# Patient Record
Sex: FEMALE
[De-identification: names, ages, dates, MRNs, and addresses within clinical notes are randomized per-mention and may not be internally consistent; named-entity substitution may affect disease eponyms.]

---

## 2019-04-20 PROBLEM — Z00.00 ENCOUNTER FOR PREVENTIVE HEALTH EXAMINATION: Status: ACTIVE | Noted: 2019-04-20

## 2019-05-20 ENCOUNTER — LABORATORY RESULT (OUTPATIENT)
Age: 29
End: 2019-05-20

## 2019-05-20 ENCOUNTER — APPOINTMENT (OUTPATIENT)
Dept: OBGYN | Facility: CLINIC | Age: 29
End: 2019-05-20
Payer: COMMERCIAL

## 2019-05-20 VITALS
SYSTOLIC BLOOD PRESSURE: 118 MMHG | BODY MASS INDEX: 33.66 KG/M2 | HEIGHT: 63 IN | DIASTOLIC BLOOD PRESSURE: 70 MMHG | WEIGHT: 190 LBS

## 2019-05-20 DIAGNOSIS — Z78.9 OTHER SPECIFIED HEALTH STATUS: ICD-10-CM

## 2019-05-20 DIAGNOSIS — Z81.8 FAMILY HISTORY OF OTHER MENTAL AND BEHAVIORAL DISORDERS: ICD-10-CM

## 2019-05-20 DIAGNOSIS — Z30.41 ENCOUNTER FOR SURVEILLANCE OF CONTRACEPTIVE PILLS: ICD-10-CM

## 2019-05-20 DIAGNOSIS — Z91.89 OTHER SPECIFIED PERSONAL RISK FACTORS, NOT ELSEWHERE CLASSIFIED: ICD-10-CM

## 2019-05-20 DIAGNOSIS — N92.6 IRREGULAR MENSTRUATION, UNSPECIFIED: ICD-10-CM

## 2019-05-20 LAB
HCG UR QL: NEGATIVE
QUALITY CONTROL: YES

## 2019-05-20 PROCEDURE — 81025 URINE PREGNANCY TEST: CPT

## 2019-05-20 PROCEDURE — 99385 PREV VISIT NEW AGE 18-39: CPT

## 2019-05-20 NOTE — PHYSICAL EXAM
[Awake] : awake [Alert] : alert [Soft] : soft [Oriented x3] : oriented to person, place, and time [Normal] : uterus [No Bleeding] : there was no active vaginal bleeding [Uterine Adnexae] : were not tender and not enlarged [RRR, No Murmurs] : RRR, no murmurs [CTAB] : CTAB [Acute Distress] : no acute distress [LAD] : no lymphadenopathy [Thyroid Nodule] : no thyroid nodule [Goiter] : no goiter [Mass] : no breast mass [Nipple Discharge] : no nipple discharge [Axillary LAD] : no axillary lymphadenopathy [Tender] : non tender [Discharge] : a  ~M vaginal discharge was present [Heavy] : heavy [White] : white [Thick] : thick [FreeTextEntry5] : irregular endo cervix ( surgery sequelae)

## 2019-05-20 NOTE — HISTORY OF PRESENT ILLNESS
[___ Month(s) Ago] : [unfilled] month(s) ago [Good] : being in good health [Regular Exercise] : regular exercise [Weight Concerns] : weight concens [___ Servings of Fruit/Day] : [unfilled] servings of fruit per day [___ Servings of Vegetables/Day] : [unfilled] servings of vegetables per day [___ Servings of Meat/Day] : [unfilled] servings of meat per day [___ Servings of Whole Grains/Day] : [unfilled] servings of whole grains per day [___ Servings of Simple Carbs/Day] : [unfilled] servings of simple carbohydrates per day [___ Servings of Dairy/Day] : [unfilled] servings of dairy foods per day [___ Cups of Coffee/Day] : [unfilled] cups of coffee per day [___ Cups of Tea/Day] : [unfilled] cups of tea per day [___ Cans of Regular Soda/Day] : [unfilled] cans of regular soda per day [3 or more times/wk] :  3 or more times per week [Serious Attempts To Lose] : serious weight loss attempts [Current] : risk screening reviewed and current [Performed Within 5 Years] : lipid profile performed within the past five years [Performed Last Year] : thyroid function test performed last year [Abnormal Cervical Cytology] : abnormal cervical cytology [HPV Positive] : positive screening for human papilloma virus [Reproductive Age] : is of reproductive age [Menarche Age ____] : age at menarche was [unfilled] [Definite ___ (Date)] : the last menstrual period was [unfilled] [Lighter Bleeding] : bleeding has been lighter than normal [Regular Cycle Intervals] : have been regular [Contraception] : uses contraception [Oral Contraceptive] : uses oral contraception pills [Monogamous (Male Partner)] : is monogamous with a male partner [HPV Vaccine ___] : HPV vaccine [unfilled] [Sexually Active] : is sexually active [Monogamous] : is monogamous [Age of First Sexual [place] ___] : became sexually active at the age of [unfilled] [Male ___] : [unfilled] male [Yes] : yes [Hypertension] : no hypertension [Diabetes] : no diabetes [High LDL] : no high LDL cholesterol [Low HDL] : no low HDL cholesterol [Stress] : no stress [Obesity] : no obesity [Tobacco Use] : no tobacco use [Illicit Drug Use] : no illicit drug use [Sedentary Lifestyle] : no sedentary lifestyle [FH Cardiovascular Disease] : no family history of cardiovascular disease [Previous Breast Cancer] : no previous breast cancer [Inflammatory Bowel Disease] : no inflammatory bowel disease [Menstrual Problems] : reports normal menses [Pregnancy History] : denies prior pregnancies

## 2019-05-20 NOTE — COUNSELING
[Breast Self Exam] : breast self exam [Nutrition] : nutrition [Exercise] : exercise [Vitamins/Supplements] : vitamins/supplements [STD (testing, results, tx)] : STD (testing, results, tx) [Contraception] : contraception [Emergency Contraception] : emergency contraception [Vaccines] : vaccines [Safe Sexual Practices] : safe sexual practices

## 2019-05-23 LAB
C TRACH RRNA SPEC QL NAA+PROBE: NOT DETECTED
N GONORRHOEA RRNA SPEC QL NAA+PROBE: NOT DETECTED
SOURCE TP AMPLIFICATION: NORMAL

## 2019-05-30 LAB — CYTOLOGY CVX/VAG DOC THIN PREP: NORMAL

## 2019-06-06 ENCOUNTER — RESULT REVIEW (OUTPATIENT)
Age: 29
End: 2019-06-06

## 2019-07-17 ENCOUNTER — APPOINTMENT (OUTPATIENT)
Dept: OBGYN | Facility: CLINIC | Age: 29
End: 2019-07-17
Payer: COMMERCIAL

## 2019-07-17 VITALS
SYSTOLIC BLOOD PRESSURE: 120 MMHG | HEIGHT: 63 IN | DIASTOLIC BLOOD PRESSURE: 72 MMHG | BODY MASS INDEX: 32.6 KG/M2 | WEIGHT: 184 LBS

## 2019-07-17 LAB
BILIRUB UR QL STRIP: NORMAL
CLARITY UR: NORMAL
COLLECTION METHOD: NORMAL
GLUCOSE UR-MCNC: NORMAL
HCG UR QL: 1 EU/DL
HCG UR QL: NEGATIVE
HGB UR QL STRIP.AUTO: NORMAL
KETONES UR-MCNC: NORMAL
LEUKOCYTE ESTERASE UR QL STRIP: NORMAL
NITRITE UR QL STRIP: NORMAL
PH UR STRIP: 6.5
PROT UR STRIP-MCNC: NORMAL
QUALITY CONTROL: YES
SP GR UR STRIP: 1.03

## 2019-07-17 PROCEDURE — 57456 ENDOCERV CURETTAGE W/SCOPE: CPT

## 2019-07-17 PROCEDURE — 81025 URINE PREGNANCY TEST: CPT

## 2019-07-17 PROCEDURE — 81003 URINALYSIS AUTO W/O SCOPE: CPT | Mod: QW

## 2019-07-17 NOTE — PROCEDURE
[Colposcopy] : colposcopy [HPV high risk] : PCR positive for high risk HPV [LGSIL] : low grade squamous intraepithelial lesion [Patient] : patient [Risks] : risks [Benefits] : benefits [Alternatives] : alternatives [Infection] : infection [Bleeding] : bleeding [Allergic Reaction] : allergic reaction [Consent Obtained] : written consent was obtained prior to the procedure [Ibuprofen ___ mg] : ibuprofen [unfilled] ~Umg [Pap Performed] : a cervical Pap smear was not performed [SCJ Fully Visulized] : the squamocolumnar junction was not fully visualized [Punctation ___ o'clock] : punctation at [unfilled] ~Uo'clock [Acetowhite ___ o'clock] : ascetowhite changes at [unfilled] ~Uo'clock [No Abnormalities] : no abnormalities seen [Biopsies Taken: # ___] : [unfilled] biopsies taken of the cervix [Biopsy Locations ___ o'clock] : the biopsies were taken at [unfilled] o'clock [ECC Done] : Endocervical curettage was performed.  [Amirah's] : Amirah's solution [Direct Pressure] : direct pressure [Tolerated Well] : the patient tolerated the procedure well [No Complications] : there were no complications [FreeTextEntry2] : ECTROPION AT ANTERIOR CX LIP

## 2019-07-25 LAB — CORE LAB BIOPSY: NORMAL

## 2019-11-21 ENCOUNTER — RX RENEWAL (OUTPATIENT)
Age: 29
End: 2019-11-21

## 2020-05-21 ENCOUNTER — APPOINTMENT (OUTPATIENT)
Dept: OBGYN | Facility: CLINIC | Age: 30
End: 2020-05-21
Payer: COMMERCIAL

## 2020-05-21 ENCOUNTER — LABORATORY RESULT (OUTPATIENT)
Age: 30
End: 2020-05-21

## 2020-05-21 VITALS
HEIGHT: 63 IN | SYSTOLIC BLOOD PRESSURE: 124 MMHG | WEIGHT: 187 LBS | DIASTOLIC BLOOD PRESSURE: 68 MMHG | BODY MASS INDEX: 33.13 KG/M2

## 2020-05-21 DIAGNOSIS — R87.612 LOW GRADE SQUAMOUS INTRAEPITHELIAL LESION ON CYTOLOGIC SMEAR OF CERVIX (LGSIL): ICD-10-CM

## 2020-05-21 DIAGNOSIS — Z01.419 ENCOUNTER FOR GYNECOLOGICAL EXAMINATION (GENERAL) (ROUTINE) W/OUT ABNORMAL FINDINGS: ICD-10-CM

## 2020-05-21 DIAGNOSIS — Z87.42 PERSONAL HISTORY OF OTHER DISEASES OF THE FEMALE GENITAL TRACT: ICD-10-CM

## 2020-05-21 LAB
HCG UR QL: NEGATIVE
QUALITY CONTROL: YES

## 2020-05-21 PROCEDURE — 81025 URINE PREGNANCY TEST: CPT

## 2020-05-21 PROCEDURE — 99395 PREV VISIT EST AGE 18-39: CPT

## 2020-05-21 RX ORDER — LEVONORGESTREL AND ETHINYL ESTRADIOL 0.1-0.02MG
0.1-2 KIT ORAL
Qty: 84 | Refills: 3 | Status: ACTIVE | COMMUNITY
Start: 2019-05-20 | End: 1900-01-01

## 2020-05-21 NOTE — HISTORY OF PRESENT ILLNESS
[1 Year Ago] : 1 year ago [Good] : being in good health [Regular Exercise] : regular exercise [Weight Concerns] : weight concens [___ Servings of Fruit/Day] : [unfilled] servings of fruit per day [___ Servings of Vegetables/Day] : [unfilled] servings of vegetables per day [___ Servings of Meat/Day] : [unfilled] servings of meat per day [___ Servings of Whole Grains/Day] : [unfilled] servings of whole grains per day [___ Servings of Simple Carbs/Day] : [unfilled] servings of simple carbohydrates per day [___ Servings of Dairy/Day] : [unfilled] servings of dairy foods per day [___ Cups of Coffee/Day] : [unfilled] cups of coffee per day [___ Cups of Tea/Day] : [unfilled] cups of tea per day [___ Cans of Regular Soda/Day] : [unfilled] cans of regular soda per day [Serious Attempts To Lose] : serious weight loss attempts [3 or more times/wk] :  3 or more times per week [Current] : risk screening reviewed and current [Last Pap ___] : Last cervical pap smear was [unfilled] [Performed Within 5 Years] : lipid profile performed within the past five years [Performed Last Year] : thyroid function test performed last year [Abnormal Cervical Cytology] : abnormal cervical cytology [HPV Positive] : positive screening for human papilloma virus [Reproductive Age] : is of reproductive age [Menarche Age ____] : age at menarche was [unfilled] [Definite ___ (Date)] : the last menstrual period was [unfilled] [Lighter Bleeding] : bleeding has been lighter than normal [Regular Cycle Intervals] : have been regular [Contraception] : uses contraception [Oral Contraceptive] : uses oral contraception pills [Monogamous (Male Partner)] : is monogamous with a male partner [HPV Vaccine ___] : HPV vaccine [unfilled] [Last Screen ___] : last STD screen was [unfilled] [Sexually Active] : is sexually active [Monogamous] : is monogamous [Age of First Sexual [place] ___] : became sexually active at the age of [unfilled] [Male ___] : [unfilled] male [Yes] : yes [Healthy Diet] : a healthy diet [Hypertension] : no hypertension [Diabetes] : no diabetes [High LDL] : no high LDL cholesterol [Low HDL] : no low HDL cholesterol [Stress] : no stress [Tobacco Use] : no tobacco use [Obesity] : no obesity [Illicit Drug Use] : no illicit drug use [Sedentary Lifestyle] : no sedentary lifestyle [FH Cardiovascular Disease] : no family history of cardiovascular disease [Previous Breast Cancer] : no previous breast cancer [Inflammatory Bowel Disease] : no inflammatory bowel disease [Menstrual Problems] : reports normal menses [Pregnancy History] : denies prior pregnancies [Chlamydia] : Chlamydia - [Gonorrhea] : Gonorrhea -

## 2020-05-21 NOTE — PHYSICAL EXAM
[Awake] : awake [Alert] : alert [Soft] : soft [Oriented x3] : oriented to person, place, and time [Normal] : uterus [Discharge] : a  ~M vaginal discharge was present [Heavy] : heavy [White] : white [Thick] : thick [No Bleeding] : there was no active vaginal bleeding [Uterine Adnexae] : were not tender and not enlarged [RRR, No Murmurs] : RRR, no murmurs [CTAB] : CTAB [Acute Distress] : no acute distress [LAD] : no lymphadenopathy [Thyroid Nodule] : no thyroid nodule [Goiter] : no goiter [Mass] : no breast mass [Nipple Discharge] : no nipple discharge [Tender] : non tender [Axillary LAD] : no axillary lymphadenopathy [FreeTextEntry4] : continued cervical leukorrhea [FreeTextEntry5] : irregular endo cervix ( surgery sequelae)

## 2020-05-21 NOTE — CHIEF COMPLAINT
[Annual Visit] : annual visit [FreeTextEntry1] : Pt here for annual, repap and to refill ocp\par Pt complains of some pms problems for about last few months

## 2020-06-01 LAB — CYTOLOGY CVX/VAG DOC THIN PREP: ABNORMAL

## 2020-09-17 ENCOUNTER — APPOINTMENT (OUTPATIENT)
Dept: OBGYN | Facility: CLINIC | Age: 30
End: 2020-09-17
Payer: COMMERCIAL

## 2020-09-17 VITALS — DIASTOLIC BLOOD PRESSURE: 72 MMHG | SYSTOLIC BLOOD PRESSURE: 130 MMHG | HEIGHT: 63 IN

## 2020-09-17 DIAGNOSIS — N89.8 OTHER SPECIFIED NONINFLAMMATORY DISORDERS OF VAGINA: ICD-10-CM

## 2020-09-17 DIAGNOSIS — N94.10 UNSPECIFIED DYSPAREUNIA: ICD-10-CM

## 2020-09-17 PROCEDURE — 99213 OFFICE O/P EST LOW 20 MIN: CPT

## 2020-09-17 NOTE — CHIEF COMPLAINT
[Urgent Visit] : Urgent Visit [FreeTextEntry1] : Pt states that she is having some discomfort at beginning of intercourse and afterward has pain urinating.   Cannot tolerate intercourse twice in one day.

## 2020-09-17 NOTE — HISTORY OF PRESENT ILLNESS
[___ Month(s) Ago] : [unfilled] month(s) ago [Last Pap ___] : Last cervical pap smear was [unfilled] [Last Screen ___] : last STD screen was [unfilled] [Chlamydia] : Chlamydia - [Gonorrhea] : Gonorrhea -

## 2020-09-17 NOTE — PHYSICAL EXAM
[Normal] : uterus [Discharge] : a  ~M vaginal discharge was present [Moderate] : moderate [Keo] : yellow [Thin] : thin [No Bleeding] : there was no active vaginal bleeding [Uterine Adnexae] : were not tender and not enlarged

## 2020-09-21 LAB
CANDIDA VAG CYTO: NOT DETECTED
G VAGINALIS+PREV SP MTYP VAG QL MICRO: NOT DETECTED
T VAGINALIS VAG QL WET PREP: NOT DETECTED

## 2020-12-01 ENCOUNTER — APPOINTMENT (OUTPATIENT)
Dept: OTOLARYNGOLOGY | Facility: CLINIC | Age: 30
End: 2020-12-01
Payer: COMMERCIAL

## 2020-12-01 VITALS
WEIGHT: 180 LBS | BODY MASS INDEX: 31.89 KG/M2 | HEART RATE: 86 BPM | DIASTOLIC BLOOD PRESSURE: 63 MMHG | TEMPERATURE: 98.3 F | HEIGHT: 63 IN | SYSTOLIC BLOOD PRESSURE: 109 MMHG

## 2020-12-01 DIAGNOSIS — H60.311 DIFFUSE OTITIS EXTERNA, RIGHT EAR: ICD-10-CM

## 2020-12-01 DIAGNOSIS — H60.543 ACUTE ECZEMATOID OTITIS EXTERNA, BILATERAL: ICD-10-CM

## 2020-12-01 DIAGNOSIS — H60.312 DIFFUSE OTITIS EXTERNA, LEFT EAR: ICD-10-CM

## 2020-12-01 PROCEDURE — 99072 ADDL SUPL MATRL&STAF TM PHE: CPT

## 2020-12-01 PROCEDURE — 99204 OFFICE O/P NEW MOD 45 MIN: CPT

## 2020-12-01 RX ORDER — ERGOCALCIFEROL 1.25 MG/1
1.25 MG CAPSULE, LIQUID FILLED ORAL
Qty: 4 | Refills: 0 | Status: ACTIVE | COMMUNITY
Start: 2020-11-07

## 2020-12-01 RX ORDER — MOMETASONE FUROATE 1 MG/G
0.1 OINTMENT TOPICAL
Qty: 1 | Refills: 2 | Status: ACTIVE | COMMUNITY
Start: 2020-12-01 | End: 1900-01-01

## 2020-12-01 NOTE — REASON FOR VISIT
[Initial Consultation] : an initial consultation for [Ear Pain] : ear pain [FreeTextEntry2] : CLOGGED EAR  /DRYNESS

## 2020-12-01 NOTE — REVIEW OF SYSTEMS
[Negative] : Heme/Lymph [Seasonal Allergies] : seasonal allergies [Ear Pain] : ear pain [Ear Itch] : ear itch [Throat Dryness] : throat dryness [Throat Itching] : throat itching [Patient Intake Form Reviewed] : Patient intake form was reviewed [FreeTextEntry1] : itching

## 2020-12-01 NOTE — ASSESSMENT
[FreeTextEntry1] : otitis externa\par cherelle powder\par ofloxin gtts\par chronic ear eczema-mometasone ointment prn\par fu prn

## 2020-12-01 NOTE — PHYSICAL EXAM
[Midline] : trachea located in midline position [Normal] : no rashes [de-identified] : eac red dry  canals and conchae au

## 2020-12-10 ENCOUNTER — TRANSCRIPTION ENCOUNTER (OUTPATIENT)
Age: 30
End: 2020-12-10

## 2020-12-10 ENCOUNTER — APPOINTMENT (OUTPATIENT)
Dept: OBGYN | Facility: CLINIC | Age: 30
End: 2020-12-10
Payer: COMMERCIAL

## 2020-12-10 VITALS
DIASTOLIC BLOOD PRESSURE: 72 MMHG | TEMPERATURE: 98.7 F | SYSTOLIC BLOOD PRESSURE: 104 MMHG | BODY MASS INDEX: 33.66 KG/M2 | WEIGHT: 190 LBS | HEIGHT: 63 IN

## 2020-12-10 DIAGNOSIS — R87.610 ATYPICAL SQUAMOUS CELLS OF UNDETERMINED SIGNIFICANCE ON CYTOLOGIC SMEAR OF CERVIX (ASC-US): ICD-10-CM

## 2020-12-10 PROCEDURE — 99072 ADDL SUPL MATRL&STAF TM PHE: CPT

## 2020-12-10 PROCEDURE — 99213 OFFICE O/P EST LOW 20 MIN: CPT

## 2020-12-10 NOTE — PLAN
[FreeTextEntry1] : Some questions about hormonal imbalance and weight loss, metabolic changes with age and caloric needs contributing to weight gain\par pt is on ocp, no problems

## 2020-12-17 LAB — CYTOLOGY CVX/VAG DOC THIN PREP: ABNORMAL

## 2020-12-23 PROBLEM — Z01.419 ENCOUNTER FOR GYNECOLOGICAL EXAMINATION WITH PAPANICOLAOU SMEAR OF CERVIX: Status: RESOLVED | Noted: 2019-05-20 | Resolved: 2020-12-23

## 2021-03-09 ENCOUNTER — APPOINTMENT (OUTPATIENT)
Dept: DERMATOLOGY | Facility: CLINIC | Age: 31
End: 2021-03-09
Payer: COMMERCIAL

## 2021-03-09 DIAGNOSIS — L84 CORNS AND CALLOSITIES: ICD-10-CM

## 2021-03-09 DIAGNOSIS — Z91.89 OTHER SPECIFIED PERSONAL RISK FACTORS, NOT ELSEWHERE CLASSIFIED: ICD-10-CM

## 2021-03-09 DIAGNOSIS — R21 RASH AND OTHER NONSPECIFIC SKIN ERUPTION: ICD-10-CM

## 2021-03-09 PROCEDURE — 99203 OFFICE O/P NEW LOW 30 MIN: CPT

## 2021-03-09 PROCEDURE — 99072 ADDL SUPL MATRL&STAF TM PHE: CPT

## 2021-03-09 RX ORDER — NEOMYCIN AND POLYMYXIN B SULFATES AND HYDROCORTISONE OTIC 10; 3.5; 1 MG/ML; MG/ML; [USP'U]/ML
3.5-10000-1 SUSPENSION AURICULAR (OTIC)
Qty: 10 | Refills: 0 | Status: DISCONTINUED | COMMUNITY
Start: 2020-10-02 | End: 2021-03-09

## 2021-03-09 RX ORDER — IBUPROFEN 200 MG/1
200 TABLET, COATED ORAL
Qty: 2 | Refills: 0 | Status: DISCONTINUED | OUTPATIENT
Start: 2019-07-17 | End: 2021-03-09

## 2021-03-09 RX ORDER — TRIAMCINOLONE ACETONIDE 1 MG/G
0.1 CREAM TOPICAL
Qty: 1 | Refills: 2 | Status: ACTIVE | COMMUNITY
Start: 2021-03-09 | End: 1900-01-01

## 2021-03-09 RX ORDER — OFLOXACIN OTIC 3 MG/ML
0.3 SOLUTION AURICULAR (OTIC) TWICE DAILY
Qty: 1 | Refills: 1 | Status: DISCONTINUED | COMMUNITY
Start: 2020-12-01 | End: 2021-03-09

## 2021-03-09 RX ORDER — UBIDECARENONE/VIT E ACET 100MG-5
CAPSULE ORAL
Refills: 0 | Status: DISCONTINUED | COMMUNITY
End: 2021-03-09

## 2021-03-09 NOTE — PHYSICAL EXAM
[Alert] : alert [Oriented x 3] : ~L oriented x 3 [Well Nourished] : well nourished [FreeTextEntry3] : Type II skin\par \par Patient wearing a face mask\par \par Dorsum right hand: Moderate-multiple almost confluent 1 mm pink papule vesicles\par Mild faint erythema and few papules present on the dorsum of the left hand\par \par Right distal lateral sole: 6 x 6 mm tan calloused plaque with focal hemorrhage\par No obvious wart present

## 2021-03-09 NOTE — HISTORY OF PRESENT ILLNESS
[FreeTextEntry1] : Growth on right sole [de-identified] : First visit for 30-year-old white female with history of a tender lump in the right sole.\par Had a teledoc visit - diagnosed possible "corn".  Treated with 40% urea cream for one month without improvement.\par \par Patient also complains of a mildly pruritic rash on the dorsum of the hands present for one day. Has had a similar problem in the past but she has attributed to alcohol consumption although she has not had any recently.  Self treated with over-the-counter "lotion".

## 2022-08-23 NOTE — ASSESSMENT
[FreeTextEntry1] : Rash on dorsum of hands of uncertain etiology-?? Photodermatitis\par Callus on her right sole
coffee